# Patient Record
Sex: FEMALE
[De-identification: names, ages, dates, MRNs, and addresses within clinical notes are randomized per-mention and may not be internally consistent; named-entity substitution may affect disease eponyms.]

---

## 2023-05-23 ENCOUNTER — RX ONLY (OUTPATIENT)
Age: 43
Setting detail: RX ONLY
End: 2023-05-23

## 2023-05-30 ENCOUNTER — APPOINTMENT (RX ONLY)
Dept: URBAN - METROPOLITAN AREA CLINIC 55 | Facility: CLINIC | Age: 43
Setting detail: DERMATOLOGY
End: 2023-05-30

## 2023-05-30 DIAGNOSIS — Z41.9 ENCOUNTER FOR PROCEDURE FOR PURPOSES OTHER THAN REMEDYING HEALTH STATE, UNSPECIFIED: ICD-10-CM

## 2023-05-30 PROCEDURE — ? COSMETIC CONSULTATION: BOTULINUM TOXIN

## 2023-05-30 PROCEDURE — ? DYSPORT

## 2023-05-30 PROCEDURE — ? COSMETIC CONSULTATION: GENERAL

## 2023-05-30 PROCEDURE — ? INVENTORY

## 2023-05-30 NOTE — PROCEDURE: DYSPORT
Forehead Units: 15
Post-Care Instructions: Patient instructed to not lie down flat for 4 hours or rub the treatment area.
Lcl Root Units: 0
Show Right And Left Periorbital Units: No
Show Masseter Units: Yes
Detail Level: Detailed
Dilution (U/0.1 Cc): 10
Glabellar Complex Units: 25
Additional Area 1 Location: upper cutaneous
Additional Area 2 Location: [facility]
Show Inventory Tab: Show
Periorbital Skin Units: 35
Consent obtained and risk reviewed.

## 2023-09-15 ENCOUNTER — APPOINTMENT (RX ONLY)
Dept: URBAN - METROPOLITAN AREA CLINIC 55 | Facility: CLINIC | Age: 43
Setting detail: DERMATOLOGY
End: 2023-09-15

## 2023-09-15 DIAGNOSIS — Z41.9 ENCOUNTER FOR PROCEDURE FOR PURPOSES OTHER THAN REMEDYING HEALTH STATE, UNSPECIFIED: ICD-10-CM

## 2023-09-15 PROCEDURE — ? INVENTORY

## 2023-09-15 PROCEDURE — ? NOVATHREADS

## 2023-09-15 NOTE — PROCEDURE: NOVATHREADS
Needle Gauge 2: 29G
Postcare Statement Text: There were no complications and the patient was given postcare instructions and ice packs.
Post-Care Instructions (For The Patient Hand-Out): Patient should apply ice packs to minimize swelling.
Location 1: Lower Eyelids
Add Another Thread?: no
Thread Size 6: 0
Treatment Number (Optional): 1
Add Another Thread?: yes
Thread Size 2: 1.5
Detail Level: Zone
Pre-Procedure Text: The treatment areas were cleaned with chlorhexidine and then wiped with alcohol pad. Insertion sites anesthetized with 1% lidocaine with epi 1:100,000.
Number Of Threads: 32
Thread Type 2: Twist Thread, Blunt Cannula
Consent was obtained.  The risks of the procedure were discussed with the patient.
Anesthesia Type: 1% lidocaine with epinephrine
Thread Type 1: Smooth Thread, Blunt Cannula
Procedure Text: An 18 gauge needle was used to create the insertions points and the NovaThreads were inserted subcutaneously as shown on diagram